# Patient Record
Sex: MALE | Race: WHITE | NOT HISPANIC OR LATINO | Employment: FULL TIME | URBAN - METROPOLITAN AREA
[De-identification: names, ages, dates, MRNs, and addresses within clinical notes are randomized per-mention and may not be internally consistent; named-entity substitution may affect disease eponyms.]

---

## 2019-02-11 ENCOUNTER — OFFICE VISIT (OUTPATIENT)
Dept: UROLOGY | Facility: CLINIC | Age: 49
End: 2019-02-11
Payer: COMMERCIAL

## 2019-02-11 VITALS
WEIGHT: 185.63 LBS | SYSTOLIC BLOOD PRESSURE: 123 MMHG | DIASTOLIC BLOOD PRESSURE: 82 MMHG | HEIGHT: 72 IN | HEART RATE: 87 BPM | BODY MASS INDEX: 25.14 KG/M2

## 2019-02-11 DIAGNOSIS — N40.1 BPH WITH URINARY OBSTRUCTION: ICD-10-CM

## 2019-02-11 DIAGNOSIS — N13.8 BPH WITH URINARY OBSTRUCTION: ICD-10-CM

## 2019-02-11 DIAGNOSIS — N41.1 PROSTATITIS, CHRONIC: Primary | ICD-10-CM

## 2019-02-11 PROCEDURE — 99999 PR PBB SHADOW E&M-NEW PATIENT-LVL III: ICD-10-PCS | Mod: PBBFAC,,, | Performed by: UROLOGY

## 2019-02-11 PROCEDURE — 99999 PR PBB SHADOW E&M-NEW PATIENT-LVL III: CPT | Mod: PBBFAC,,, | Performed by: UROLOGY

## 2019-02-11 PROCEDURE — 99244 OFF/OP CNSLTJ NEW/EST MOD 40: CPT | Mod: S$GLB,,, | Performed by: UROLOGY

## 2019-02-11 PROCEDURE — 99244 PR OFFICE CONSULTATION,LEVEL IV: ICD-10-PCS | Mod: S$GLB,,, | Performed by: UROLOGY

## 2019-02-11 RX ORDER — OMEPRAZOLE 40 MG/1
40 CAPSULE, DELAYED RELEASE ORAL DAILY
COMMUNITY
Start: 2016-12-01

## 2019-02-11 RX ORDER — ATORVASTATIN CALCIUM 10 MG/1
5 TABLET, FILM COATED ORAL NIGHTLY
Refills: 1 | COMMUNITY
Start: 2019-01-17

## 2019-02-11 RX ORDER — AMITRIPTYLINE HYDROCHLORIDE 75 MG/1
75 TABLET ORAL DAILY
COMMUNITY
Start: 2014-12-01 | End: 2022-09-01

## 2019-02-11 RX ORDER — CELECOXIB 200 MG/1
200 CAPSULE ORAL DAILY
COMMUNITY
Start: 2018-12-01 | End: 2022-09-01

## 2019-02-11 RX ORDER — TAMSULOSIN HYDROCHLORIDE 0.4 MG/1
0.4 CAPSULE ORAL NIGHTLY
Qty: 30 CAPSULE | Refills: 11 | Status: SHIPPED | OUTPATIENT
Start: 2019-02-11 | End: 2022-09-01

## 2019-02-11 RX ORDER — CIPROFLOXACIN 500 MG/1
500 TABLET ORAL 2 TIMES DAILY
Qty: 30 TABLET | Refills: 0 | Status: SHIPPED | OUTPATIENT
Start: 2019-02-11 | End: 2019-02-19

## 2019-02-11 NOTE — PROGRESS NOTES
CC: urinary frequency, urgency, hx of prostatitis, epididymitis    Jakob Taylor is a 48 y.o. man who is here for the evaluation of Urinary Frequency; Nocturia; and Dysuria    A new pt referred by his PCP, Dr. Drew Marrero.  He is here to establish his urologic care with me.  Seen by other urologists in the past.  He had vasectomy done by Dr. Gonsalez and was treated for epididymitis in the past.  Also developed prostatitis and was treated by another urologist.  He is a  and has been drinking a gallon to 1 1/2 gallon every day.  Noticed increased frequency and urgency.  Also c/o intermittent penile pain at the tip of his penis, which got better after 2 days or so.  Denies flank pain, dysuira, hematuria.      No family hx of prostate cancer.      Past Medical History:   Diagnosis Date    Hernia, inguinal, left     History of chicken pox      Past Surgical History:   Procedure Laterality Date    HERNIA REPAIR      TONSILLECTOMY       Social History     Tobacco Use    Smoking status: Never Smoker    Smokeless tobacco: Never Used   Substance Use Topics    Alcohol use: No     Alcohol/week: 0.0 oz    Drug use: No     Family History   Family history unknown: Yes     Allergy:  Review of patient's allergies indicates:  No Known Allergies  Outpatient Encounter Medications as of 2/11/2019   Medication Sig Dispense Refill    amitriptyline (ELAVIL) 75 MG tablet Take 75 mg by mouth once daily.      atorvastatin (LIPITOR) 10 MG tablet Take 10 mg by mouth every evening.  1    celecoxib (CELEBREX) 200 MG capsule Take 200 mg by mouth once daily.      omeprazole (PRILOSEC) 40 MG capsule Take 40 mg by mouth once daily.      amitriptyline (ELAVIL) 150 MG Tab Take 150 mg by mouth every evening.      ciprofloxacin HCl (CIPRO) 500 MG tablet Take 1 tablet (500 mg total) by mouth 2 (two) times daily. for 15 doses 30 tablet 0    hydrocodone-acetaminophen 5-325mg (NORCO) 5-325 mg per tablet Take 1 tablet by  mouth nightly as needed. 30 tablet 0    meloxicam (MOBIC) 15 MG tablet Take 15 mg by mouth once daily.      metaxalone (SKELAXIN) 800 MG tablet Take 1 tablet (800 mg total) by mouth 3 (three) times daily as needed. 90 tablet 0    tamsulosin (FLOMAX) 0.4 mg Cap Take 1 capsule (0.4 mg total) by mouth every evening. 30 capsule 11     No facility-administered encounter medications on file as of 2/11/2019.      Review of Systems   ROS  Physical Exam     Vitals:    02/11/19 1514   BP: 123/82   Pulse: 87     Physical Exam   Constitutional: He is oriented to person, place, and time. He appears well-developed and well-nourished. No distress.   HENT:   Head: Normocephalic and atraumatic.   Right Ear: External ear normal.   Left Ear: External ear normal.   Nose: Nose normal.   Mouth/Throat: Oropharynx is clear and moist.   Eyes: Conjunctivae are normal. Pupils are equal, round, and reactive to light.   Neck: Normal range of motion. Neck supple. No JVD present. No tracheal deviation present. No thyromegaly present.   Cardiovascular: Normal rate, regular rhythm, normal heart sounds and intact distal pulses.  Exam reveals no gallop and no friction rub.    No murmur heard.  Pulmonary/Chest: Effort normal and breath sounds normal. No respiratory distress. He has no wheezes. He exhibits no tenderness.   Abdominal: Soft. Bowel sounds are normal. He exhibits no distension and no mass. There is no tenderness. There is no rebound and no guarding.   Genitourinary: Rectum normal and penis normal. No penile tenderness.   Genitourinary Comments: Prostate 30 grams with negative nodule or negative tenderness, but slight boggy.     Musculoskeletal: Normal range of motion. He exhibits no edema, tenderness or deformity.   Lymphadenopathy:     He has no cervical adenopathy.   Neurological: He is alert and oriented to person, place, and time.   Skin: Skin is warm and dry. He is not diaphoretic.     Psychiatric: He has a normal mood and affect.  His behavior is normal. Thought content normal.     Genitalia:  Scrotum: no rash or lesion  Normal symmetric epididymis without masses  Normal vas palpated  Normal size, symmetric testicles with no masses   Normal urethral meatus with no discharge  Normal circumcised penis with no lesion   Rectal:  Normal perineum and anus upon inspection.  Normal tone, no masses or tenderness;     LABS:  Lab Results   Component Value Date    PSA 0.5 05/19/2008     No results found for this or any previous visit.  Lab Results   Component Value Date    CREATININE 1.1 05/15/2008     No results found for this or any previous visit.  No results found for: LABURIN  UA clear    Assessment and Plan:  Jakob was seen today for urinary frequency, nocturia and dysuria.    Diagnoses and all orders for this visit:    Prostatitis, chronic  -     ciprofloxacin HCl (CIPRO) 500 MG tablet; Take 1 tablet (500 mg total) by mouth 2 (two) times daily. for 15 doses    BPH with urinary obstruction  -     tamsulosin (FLOMAX) 0.4 mg Cap; Take 1 capsule (0.4 mg total) by mouth every evening.    first will treat him for suspected prostatitis with cipro for 2 wks.  Start flomax.  Do voiding diary 3 days ( volume-flow recordings).  All questions answered.    Follow-up:  Follow-up in about 6 weeks (around 3/25/2019) for voiding diary for 3 days.

## 2019-03-25 ENCOUNTER — OFFICE VISIT (OUTPATIENT)
Dept: UROLOGY | Facility: CLINIC | Age: 49
End: 2019-03-25
Payer: OTHER GOVERNMENT

## 2019-03-25 VITALS
HEART RATE: 71 BPM | HEIGHT: 72 IN | DIASTOLIC BLOOD PRESSURE: 71 MMHG | BODY MASS INDEX: 25.65 KG/M2 | SYSTOLIC BLOOD PRESSURE: 126 MMHG | WEIGHT: 189.38 LBS

## 2019-03-25 DIAGNOSIS — R33.9 INCOMPLETE BLADDER EMPTYING: ICD-10-CM

## 2019-03-25 DIAGNOSIS — R35.0 FREQUENCY OF URINATION: ICD-10-CM

## 2019-03-25 PROCEDURE — 99999 PR PBB SHADOW E&M-EST. PATIENT-LVL III: ICD-10-PCS | Mod: PBBFAC,,, | Performed by: UROLOGY

## 2019-03-25 PROCEDURE — 99999 PR PBB SHADOW E&M-EST. PATIENT-LVL III: CPT | Mod: PBBFAC,,, | Performed by: UROLOGY

## 2019-03-25 PROCEDURE — 99214 PR OFFICE/OUTPT VISIT, EST, LEVL IV, 30-39 MIN: ICD-10-PCS | Mod: S$GLB,,, | Performed by: UROLOGY

## 2019-03-25 PROCEDURE — 99214 OFFICE O/P EST MOD 30 MIN: CPT | Mod: S$GLB,,, | Performed by: UROLOGY

## 2019-03-25 NOTE — PROGRESS NOTES
CC: urinary frequency, urgency, hx of prostatitis, epididymitis    Jakob Taylor is a 48 y.o. man who is here for the evaluation of Follow-up (6 week with voiding trial)    He came with a voiding diary  After taking cipro, he experienced no more dysuria or pain at the tip of his penis.  Has been on flomax but he did not notice significant change in his frequency.  He can still use a bathroom 3 x within 1 hour.  His maximal bladder capacity 600 ml.  He typically void 225 ml.    Seen by other urologists in the past.  He had vasectomy done by Dr. Gonsalez and was treated for epididymitis in the past.  Also developed prostatitis and was treated by another urologist.  He is a  and has been drinking a gallon to 1 1/2 gallon every day.  Noticed increased frequency and urgency.  Also c/o intermittent penile pain at the tip of his penis, which got better after 2 days or so.  Denies flank pain, dysuira, hematuria.      No family hx of prostate cancer.      Past Medical History:   Diagnosis Date    Hernia, inguinal, left     History of chicken pox      Past Surgical History:   Procedure Laterality Date    HERNIA REPAIR      TONSILLECTOMY       Social History     Tobacco Use    Smoking status: Never Smoker    Smokeless tobacco: Never Used   Substance Use Topics    Alcohol use: No     Alcohol/week: 0.0 oz    Drug use: No     Family History   Family history unknown: Yes     Allergy:  Review of patient's allergies indicates:  No Known Allergies  Outpatient Encounter Medications as of 3/25/2019   Medication Sig Dispense Refill    amitriptyline (ELAVIL) 150 MG Tab Take 150 mg by mouth every evening.      amitriptyline (ELAVIL) 75 MG tablet Take 75 mg by mouth once daily.      atorvastatin (LIPITOR) 10 MG tablet Take 10 mg by mouth every evening.  1    celecoxib (CELEBREX) 200 MG capsule Take 200 mg by mouth once daily.      meloxicam (MOBIC) 15 MG tablet Take 15 mg by mouth once daily.       metaxalone (SKELAXIN) 800 MG tablet Take 1 tablet (800 mg total) by mouth 3 (three) times daily as needed. 90 tablet 0    omeprazole (PRILOSEC) 40 MG capsule Take 40 mg by mouth once daily.      tamsulosin (FLOMAX) 0.4 mg Cap Take 1 capsule (0.4 mg total) by mouth every evening. 30 capsule 11    hydrocodone-acetaminophen 5-325mg (NORCO) 5-325 mg per tablet Take 1 tablet by mouth nightly as needed. 30 tablet 0     No facility-administered encounter medications on file as of 3/25/2019.      Review of Systems   ROS  Physical Exam     Vitals:    03/25/19 1449   BP: 126/71   Pulse: 71     Physical Exam   Constitutional: He is oriented to person, place, and time. He appears well-developed and well-nourished. No distress.   HENT:   Head: Normocephalic and atraumatic.   Right Ear: External ear normal.   Left Ear: External ear normal.   Nose: Nose normal.   Mouth/Throat: Oropharynx is clear and moist.   Eyes: Conjunctivae are normal. Pupils are equal, round, and reactive to light.   Neck: Normal range of motion. Neck supple. No JVD present. No tracheal deviation present. No thyromegaly present.   Cardiovascular: Normal rate, regular rhythm, normal heart sounds and intact distal pulses.  Exam reveals no gallop and no friction rub.    No murmur heard.  Pulmonary/Chest: Effort normal and breath sounds normal. No respiratory distress. He has no wheezes. He exhibits no tenderness.   Abdominal: Soft. Bowel sounds are normal. He exhibits no distension and no mass. There is no tenderness. There is no rebound and no guarding.   Genitourinary: Rectum normal and penis normal. No penile tenderness.   Genitourinary Comments: Prostate 30 grams with negative nodule or negative tenderness, but slight boggy.     Musculoskeletal: Normal range of motion. He exhibits no edema, tenderness or deformity.   Lymphadenopathy:     He has no cervical adenopathy.   Neurological: He is alert and oriented to person, place, and time.   Skin: Skin is  warm and dry. He is not diaphoretic.     Psychiatric: He has a normal mood and affect. His behavior is normal. Thought content normal.     Genitalia:  Scrotum: no rash or lesion  Normal symmetric epididymis without masses  Normal vas palpated  Normal size, symmetric testicles with no masses   Normal urethral meatus with no discharge  Normal circumcised penis with no lesion   Rectal:  Normal perineum and anus upon inspection.  Normal tone, no masses or tenderness;     LABS:  Lab Results   Component Value Date    PSA 0.5 05/19/2008     No results found for this or any previous visit.  Lab Results   Component Value Date    CREATININE 1.1 05/15/2008     No results found for this or any previous visit.  No results found for: LABURIN  UA clear    Assessment and Plan:  Jakob was seen today for follow-up.    Diagnoses and all orders for this visit:    Frequency of urination  -     Simple Urodynamics w/ Cysto; Future    Incomplete bladder emptying  -     Simple Urodynamics w/ Cysto; Future    I reviewed his voiding diary.  suspect incomplete bladder emptying.  He does tend to drink a lot of fluid.  His urinary symptoms are much less during the weekend when he does not drink any much of water.  He is a  and he seemed to have developed certain habit of using bathroom.  Will further evaluate him with SUDS cysto.  Continue flomax .  I spent 25 minutes with the patient of which more than half was spent in direct consultation with the patient in regards to our treatment and plan.    All questions answered.    Follow-up:  Follow up for suds cysto.

## 2019-04-16 ENCOUNTER — PROCEDURE VISIT (OUTPATIENT)
Dept: UROLOGY | Facility: CLINIC | Age: 49
End: 2019-04-16
Payer: OTHER GOVERNMENT

## 2019-04-16 VITALS
BODY MASS INDEX: 25.06 KG/M2 | RESPIRATION RATE: 18 BRPM | HEIGHT: 72 IN | TEMPERATURE: 98 F | SYSTOLIC BLOOD PRESSURE: 149 MMHG | DIASTOLIC BLOOD PRESSURE: 81 MMHG | HEART RATE: 65 BPM | WEIGHT: 185 LBS

## 2019-04-16 DIAGNOSIS — R33.9 INCOMPLETE BLADDER EMPTYING: ICD-10-CM

## 2019-04-16 DIAGNOSIS — R35.0 FREQUENCY OF URINATION: ICD-10-CM

## 2019-04-16 PROCEDURE — 51728 PR COMPLEX CYSTOMETROGRAM VOIDING PRESSURE STUDIES: ICD-10-PCS | Mod: 26,,, | Performed by: UROLOGY

## 2019-04-16 PROCEDURE — 51797 PR VOIDING PRESS STUDY INTRA-ABDOMINAL VOID: ICD-10-PCS | Mod: 26,,, | Performed by: UROLOGY

## 2019-04-16 PROCEDURE — 52000 PR CYSTOURETHROSCOPY: ICD-10-PCS | Mod: 59,,, | Performed by: UROLOGY

## 2019-04-16 PROCEDURE — 51741 PR UROFLOWMETRY, COMPLEX: ICD-10-PCS | Mod: 26,51,, | Performed by: UROLOGY

## 2019-04-16 PROCEDURE — 51797 INTRAABDOMINAL PRESSURE TEST: CPT | Mod: 26,,, | Performed by: UROLOGY

## 2019-04-16 PROCEDURE — 51784 PR ANAL/URINARY MUSCLE STUDY: ICD-10-PCS | Mod: 26,51,, | Performed by: UROLOGY

## 2019-04-16 PROCEDURE — 52000 CYSTOURETHROSCOPY: CPT | Mod: 59,,, | Performed by: UROLOGY

## 2019-04-16 PROCEDURE — 51741 ELECTRO-UROFLOWMETRY FIRST: CPT | Mod: 26,51,, | Performed by: UROLOGY

## 2019-04-16 PROCEDURE — 51784 ANAL/URINARY MUSCLE STUDY: CPT | Mod: 26,51,, | Performed by: UROLOGY

## 2019-04-16 PROCEDURE — 51728 CYSTOMETROGRAM W/VP: CPT | Mod: 26,,, | Performed by: UROLOGY

## 2019-04-16 RX ORDER — LIDOCAINE HYDROCHLORIDE 20 MG/ML
JELLY TOPICAL ONCE
Status: COMPLETED | OUTPATIENT
Start: 2019-04-16 | End: 2019-04-16

## 2019-04-16 RX ORDER — CIPROFLOXACIN 500 MG/1
500 TABLET ORAL ONCE
Status: DISCONTINUED | OUTPATIENT
Start: 2019-04-16 | End: 2022-09-01

## 2019-04-16 RX ADMIN — LIDOCAINE HYDROCHLORIDE: 20 JELLY TOPICAL at 03:04

## 2019-04-16 NOTE — PATIENT INSTRUCTIONS
SIMPLE URODYNAMIC STUDY (SUDS) & CYSTOSCOPY  UROLOGY CLINIC DISCHARGE INSTRUCTIONS    You have had a procedure that will require time to properly heal. Follow the instructions you have been given on how to care for yourself once you are home. Below is additional information to help in your recovery.    ACTIVITY  · There are no restrictions in activity. Start doing again the things you did before the procedure.  · You may experience a slight burning sensation. You may notice a small amount of blood in your urine. This will clear up within a day. Call the clinic if this continues beyond 48 hours.    DIET  · Continue your normal diet. You may eat the same foods you ate before your procedure.  · Drink plenty of fluids during the first 24-48 hours following your procedure.    MEDICATIONS  · Resume all other previous medications from your prescribing physician.  · Continue any pre=procedure antibiotics until they are all gone.    SIGNS AND SYMPTOMS TO REPORT TO THE DOCTOR  · Chills or fever greater than 101° F within 24 hours of procedure.  · Changes in urination, such as increased bleeding, foul smell, cloudy urine, or painful urination.  · Call your doctor with any questions or concerns.    For any emergency situation, call [phone] immediately or go to your nearest emergency room.    [facility]  [phone]  After hours or on the weekends call [phone] and ask to speak with an urology resident on-call with any questions or concerns

## 2019-04-17 NOTE — PROCEDURES
Simple Urodynamics w/ Cysto  Date/Time: 4/16/2019 9:40 PM  Performed by: Bobby Smallwood MD  Authorized by: Bobby Smallwood MD   Comments: Procedure Date:  04/16/2019    Procedure:   Diagnostic Cystourethroscopy   Complex Cystometrogram   Voiding / Pressure Study with Intrarectal Balloon   Complex Uroflow   Electromyogram of Anal Sphincter.     Pre-OP Diagnosis:   frequency   Post-OP Diagnosis:   Normal study  Anesthesia:   Anesthesia Administered:   Intraurethral instillation of 10 mL 2% lidocaine (Xylocaine) jelly.   Findings:   --- Bladder ---   CYSTOMETROGRAM ( Filling Phase ):   Cystometric Numeric Data:   - First Desire (Sensation): 285 mL at 7cm of water.   - Normal Desire: 298mL at 6 cm of water.   - Strong Desire: 383 mL at 9 cm of water.   - Urgency (Imminent Void) : 551 mL at 14cm of water.   - Maximum Cystometric Capacity: 552 mL.   Compliance:   - normal.   Leak Point Pressure:   - Valsalva ( Abdominal ) Leak Point Pressure: none.   UROFLOW:   - Voided Volume: 595 mL.   - Residual Urine: 10 mL.   - Maximum Flow Rate: 28 mL/sec.   - Flow Pattern: normal  VOIDING PRESSURE STUDY ( Voiding Phase ):   Detrusor Pressure:   - Maximum Detrusor Pressure: 58cm of water.   - Detrusor Pressure at Maximum Flow: 50 cm of water.   - Detrusor Contraction Characteristics: Sustained contraction(s).   ELECTROMYOGRAM:   - normal.     ---Diagnostic Cystourethroscopy ---   Normal urethra.    Prostate 3.5 cm with no obstruction  Width of Bladder Neck Opening: Approximately 18 Fr.   Normal bladder.   Normal ureteral orifices bilaterally.       Description of Procedure:   Informed Consent:   - Risks, benefits and alternatives of procedure discussed with   patient and informed consent obtained.   Patient Position:   - Supine.   --- Bladder ---   Prep and Drape:   - Patient prepped and draped in usual sterile fashion using povidone   iodine (Betadine).   --- Diagnostic Cystourethroscopy ---   Instruments:   - 16 Fr flexible  cystoscope with 0 degree lens.   Procedure Details:   - Cystoscope passed under vision into bladder.   - Bladder and urethra examined in their entirety with findings as   above.   --- Urodynamic Studies ---   Procedure Details:   Cystometrogram:   - Catheter(s) passed into the bladder.   - Rectal balloon inserted.   - Catheter(s) connected to infusion medium and to pressure recording   device.   - Infusion Rate: 30 mL / min.   Electromyogram:   - Perineal electromyogram pad placed and connected to electromyogram   recording device.   Equipment:   - Catheters: Double lumen catheter.   - Medium: Liquid.   - Pressure Recording Device: Calibrated electronic equipment.   Complications:   No immediate complications.    CONCLUSIONS:   1. Normal exam    His urinary frequency is due to his high fluid intake.  No urologic problems noted.    Post-OP Plan:   Patient was discharged home in a stable condition.  Medications: abx  Follow up:  As needed

## 2022-08-23 ENCOUNTER — TELEPHONE (OUTPATIENT)
Dept: NEUROLOGY | Facility: CLINIC | Age: 52
End: 2022-08-23
Payer: OTHER GOVERNMENT

## 2022-08-23 NOTE — TELEPHONE ENCOUNTER
Returned call to patient. No answer. Left rosemariee to return call to discuss. We don't have a neurologist in [place] that treats ear clicking or muscle spasms in the face.

## 2022-08-23 NOTE — TELEPHONE ENCOUNTER
Spoke with patient and advised we don't have any neurologists in [place] that would treat ear clicking or muscle spasms in the jaw. Patient has seen PCP, ENT and dentist. Advised this might be something that is treated at main [place]. Patient states will do some research.

## 2022-08-23 NOTE — TELEPHONE ENCOUNTER
----- Message from Jim Slater sent at 8/23/2022  3:17 PM CDT -----  Regarding: appt advice  Type: Needs Medical Advice    Who Called:  kerry    Symptoms (please be specific):  ear clicking, pt has seen ent but thinks is muscle spasm issue    How long has patient had these symptoms:  3 weeks    Pharmacy name and phone #:    [id number] - [place] - [id number]  [place] [id number]  Phone: [phone] Fax: [phone]    Best Call Back Number: [phone]    Additional Information: please call to discuss proper provider for scheduling.

## 2022-09-01 ENCOUNTER — HOSPITAL ENCOUNTER (OUTPATIENT)
Dept: RADIOLOGY | Facility: HOSPITAL | Age: 52
Discharge: HOME | End: 2022-09-01
Attending: PHYSICIAN ASSISTANT
Payer: OTHER GOVERNMENT

## 2022-09-01 ENCOUNTER — OFFICE VISIT (OUTPATIENT)
Dept: PAIN MEDICINE | Facility: CLINIC | Age: 52
End: 2022-09-01
Payer: OTHER GOVERNMENT

## 2022-09-01 VITALS
DIASTOLIC BLOOD PRESSURE: 84 MMHG | BODY MASS INDEX: 24.22 KG/M2 | HEIGHT: 72 IN | HEART RATE: 83 BPM | WEIGHT: 178.81 LBS | SYSTOLIC BLOOD PRESSURE: 132 MMHG

## 2022-09-01 DIAGNOSIS — M54.50 CHRONIC BILATERAL LOW BACK PAIN WITHOUT SCIATICA: Primary | ICD-10-CM

## 2022-09-01 DIAGNOSIS — M54.50 CHRONIC BILATERAL LOW BACK PAIN WITHOUT SCIATICA: ICD-10-CM

## 2022-09-01 DIAGNOSIS — G89.29 CHRONIC BILATERAL LOW BACK PAIN WITHOUT SCIATICA: ICD-10-CM

## 2022-09-01 DIAGNOSIS — G89.29 CHRONIC BILATERAL LOW BACK PAIN WITHOUT SCIATICA: Primary | ICD-10-CM

## 2022-09-01 PROCEDURE — 99203 PR OFFICE/OUTPT VISIT, NEW, LEVL III, 30-44 MIN: ICD-10-PCS | Mod: S$GLB,,, | Performed by: PHYSICIAN ASSISTANT

## 2022-09-01 PROCEDURE — 72114 X-RAY EXAM L-S SPINE BENDING: CPT | Mod: 26,,, | Performed by: RADIOLOGY

## 2022-09-01 PROCEDURE — 99203 OFFICE O/P NEW LOW 30 MIN: CPT | Mod: S$GLB,,, | Performed by: PHYSICIAN ASSISTANT

## 2022-09-01 PROCEDURE — 99999 PR PBB SHADOW E&M-EST. PATIENT-LVL IV: CPT | Mod: PBBFAC,,, | Performed by: PHYSICIAN ASSISTANT

## 2022-09-01 PROCEDURE — 72114 X-RAY EXAM L-S SPINE BENDING: CPT | Mod: TC,PO

## 2022-09-01 PROCEDURE — 72114 XR LUMBAR SPINE 5 VIEW WITH FLEX AND EXT: ICD-10-PCS | Mod: 26,,, | Performed by: RADIOLOGY

## 2022-09-01 PROCEDURE — 99999 PR PBB SHADOW E&M-EST. PATIENT-LVL IV: ICD-10-PCS | Mod: PBBFAC,,, | Performed by: PHYSICIAN ASSISTANT

## 2022-09-01 RX ORDER — METHYLPREDNISOLONE 4 MG/1
TABLET ORAL
Qty: 1 EACH | Refills: 0 | Status: SHIPPED | OUTPATIENT
Start: 2022-09-01 | End: 2022-09-22

## 2022-09-01 RX ORDER — TIZANIDINE 4 MG/1
4 TABLET ORAL NIGHTLY PRN
Qty: 40 TABLET | Refills: 0 | Status: SHIPPED | OUTPATIENT
Start: 2022-09-01 | End: 2022-09-23

## 2022-09-01 RX ORDER — CYCLOBENZAPRINE HCL 10 MG
TABLET ORAL
COMMUNITY
Start: 2022-08-11 | End: 2022-09-01 | Stop reason: ALTCHOICE

## 2022-09-01 RX ORDER — CLONAZEPAM 1 MG/1
1 TABLET ORAL NIGHTLY
COMMUNITY

## 2022-09-01 NOTE — PROGRESS NOTES
Back and Spine New Patient    Patient ID: Jakob Taylor is a 51 y.o. male.    Chief Complaint   Patient presents with    Low-back Pain     Started 8-7-22 after moving a box, now constant, movement makes pain worse.       Review of Systems   Constitutional:  Negative for activity change, chills, fatigue and unexpected weight change.   HENT:  Positive for tinnitus. Negative for hearing loss, trouble swallowing and voice change.    Eyes:  Negative for visual disturbance.   Respiratory:  Negative for apnea, chest tightness and shortness of breath.    Cardiovascular:  Negative for chest pain and palpitations.   Gastrointestinal:  Negative for abdominal pain, constipation, diarrhea, nausea and vomiting.   Genitourinary:  Negative for difficulty urinating, dysuria and frequency.   Musculoskeletal:  Positive for back pain. Negative for gait problem, neck pain and neck stiffness.   Skin:  Negative for wound.   Neurological:  Negative for dizziness, tremors, seizures, facial asymmetry, speech difficulty, weakness, light-headedness, numbness and headaches.   Psychiatric/Behavioral:  Negative for confusion and decreased concentration.      Past Medical History:   Diagnosis Date    Hernia, inguinal, left     History of chicken pox      Social History     Socioeconomic History    Marital status:    Tobacco Use    Smoking status: Never    Smokeless tobacco: Never   Substance and Sexual Activity    Alcohol use: No     Alcohol/week: 0.0 standard drinks    Drug use: No     Family History   Family history unknown: Yes     Review of patient's allergies indicates:  No Known Allergies    Current Outpatient Medications:     atorvastatin (LIPITOR) 10 MG tablet, Take 10 mg by mouth every evening., Disp: , Rfl: 1    clonazePAM (KLONOPIN) 1 MG tablet, Take 1 mg by mouth every evening., Disp: , Rfl:     hydrocodone-acetaminophen 5-325mg (NORCO) 5-325 mg per tablet, Take 1 tablet by mouth nightly as needed., Disp: 30 tablet, Rfl:  0    meloxicam (MOBIC) 15 MG tablet, Take 15 mg by mouth once daily., Disp: , Rfl:     omeprazole (PRILOSEC) 40 MG capsule, Take 40 mg by mouth once daily., Disp: , Rfl:     methylPREDNISolone (MEDROL, ANNIE,) 4 mg tablet, use as directed, Disp: 1 each, Rfl: 0    tiZANidine (ZANAFLEX) 4 MG tablet, Take 1 tablet (4 mg total) by mouth nightly as needed (muscle spasms/ pain)., Disp: 40 tablet, Rfl: 0  No current facility-administered medications for this visit.    Vitals:    09/01/22 1301   BP: 132/84   BP Location: Right arm   Patient Position: Sitting   BP Method: Medium (Automatic)   Pulse: 83   Weight: 81.1 kg (178 lb 12.7 oz)   Height: 6' (1.829 m)       Physical Exam  Vitals and nursing note reviewed.   Constitutional:       Appearance: He is well-developed.   HENT:      Head: Normocephalic and atraumatic.   Eyes:      Pupils: Pupils are equal, round, and reactive to light.   Cardiovascular:      Rate and Rhythm: Normal rate.   Pulmonary:      Effort: Pulmonary effort is normal.   Abdominal:      General: There is no distension.   Musculoskeletal:         General: Normal range of motion.      Cervical back: Normal range of motion and neck supple.   Skin:     General: Skin is warm and dry.   Neurological:      Mental Status: He is alert and oriented to person, place, and time.      Coordination: Finger-Nose-Finger Test normal.      Gait: Gait is intact. Tandem walk normal.      Deep Tendon Reflexes:      Reflex Scores:       Tricep reflexes are 2+ on the right side and 2+ on the left side.       Bicep reflexes are 2+ on the right side and 2+ on the left side.       Brachioradialis reflexes are 2+ on the right side and 2+ on the left side.       Patellar reflexes are 2+ on the right side and 2+ on the left side.       Achilles reflexes are 2+ on the right side and 2+ on the left side.  Psychiatric:         Speech: Speech normal.         Behavior: Behavior normal.         Thought Content: Thought content normal.          Judgment: Judgment normal.       Neurologic Exam     Mental Status   Oriented to person, place, and time.   Oriented to person.   Oriented to place.   Oriented to time.   Attention: normal. Concentration: normal.   Speech: speech is normal   Level of consciousness: alert  Knowledge: consistent with education.     Cranial Nerves     CN III, IV, VI   Pupils are equal, round, and reactive to light.    CN XI   Right sternocleidomastoid strength: normal  Left sternocleidomastoid strength: normal  Right trapezius strength: normal  Left trapezius strength: normal    Motor Exam   Muscle bulk: normal  Overall muscle tone: normal  Right arm tone: normal  Left arm tone: normal  Right arm pronator drift: absent  Left arm pronator drift: absent  Right leg tone: normal  Left leg tone: normal    Strength   Right neck flexion: 5/5  Left neck flexion: 5/5  Right neck extension: 5/5  Left neck extension: 5/5  Right deltoid: 5/5  Left deltoid: 5/5  Right biceps: 5/5  Left biceps: 5/5  Right triceps: 5/5  Left triceps: 5/5  Right wrist flexion: 5/5  Left wrist flexion: 5/5  Right wrist extension: 5/5  Left wrist extension: 5/5  Right interossei: 5/5  Left interossei: 5/5  Right abdominals: 5/5  Left abdominals: 5/5  Right iliopsoas: 5/5  Left iliopsoas: 5/5  Right quadriceps: 5/5  Left quadriceps: 5/5  Right hamstrin/5  Left hamstrin/5  Right glutei: 5/5  Left glutei: 5/5  Right anterior tibial: 5/5  Left anterior tibial: 5/5  Right posterior tibial: 5/5  Left posterior tibial: 5/5  Right peroneal: 5/5  Left peroneal: 5/5  Right gastroc: 5/5  Left gastroc: 5/5    Sensory Exam   Right arm light touch: normal  Left arm light touch: normal  Right leg light touch: normal  Left leg light touch: normal    Gait, Coordination, and Reflexes     Gait  Gait: normal    Coordination   Finger to nose coordination: normal  Tandem walking coordination: normal    Tremor   Resting tremor: absent  Intention tremor: absent  Action tremor:  absent    Reflexes   Right brachioradialis: 2+  Left brachioradialis: 2+  Right biceps: 2+  Left biceps: 2+  Right triceps: 2+  Left triceps: 2+  Right patellar: 2+  Left patellar: 2+  Right achilles: 2+  Left achilles: 2+  Right Wall: absent  Left Wall: absent  Right ankle clonus: absent  Left ankle clonus: absent    Provider dictation:    51 year old male with hyperlipidemia presents to discuss lower back pain.  Pain started after moving a box 8/7/22.  He felt a pull in the lower back.  He continued to work as a /  for about a week, then dedveloped covid and was out of work for 2 weeks.  He just returned to work 3 days ago.  He has pain across the lower lumbar region.  No radicular leg pain, numbness or tingling.  Pain increased with leaning forward, twisting, movement.  He takes mobic daily and recently tried flexeril for an ear spasm, but it did not improve back pain.  He has been doing home stretching.    Current medications:  mobic  Medications tried:  fleceril  Physical therapy:  none  Interventional Procedures: none     On exam, there is 5/5 strength with 2+ DTR and no sensory deficits.  Gait and station fluid.  Denies bowel/ bladder dysfunction.  Full range of motion of the upper and lower extremities. No pain with axial facet loading.  No SI joint pain on palpation.  No pain with lumbar flexion/ extension.  No tenderness along the spinous processes.    No imaging.    Mr. Du has acute onset lower lumbar back pain without radiculopathy nor neurological deficits.  Pain pattern most consistent with myofascial/ mechanical pain.  We will try oral steroid taper and zanalfex.  Discussed PT, but with his work schedule, he is unable to make time for hit at this time.  Will get xrays to rule out structural issue and reassure him.  Will call with imaging results.  Handouts given for exercises at home.    Visit Diagnosis:  Chronic bilateral low back pain without sciatica  -      X-Ray Lumbar Complete Including Flex And Ext; Future; Expected date: 09/01/2022  -     methylPREDNISolone (MEDROL, ANNIE,) 4 mg tablet; use as directed  Dispense: 1 each; Refill: 0  -     tiZANidine (ZANAFLEX) 4 MG tablet; Take 1 tablet (4 mg total) by mouth nightly as needed (muscle spasms/ pain).  Dispense: 40 tablet; Refill: 0      Total time spent counseling greater than fifty percent of total visit time.  Counseling included discussion regarding imaging findings, diagnosis possibilities, treatment options, risks and benefits.   The patient had many questions regarding the options and long-term effects.

## 2022-09-23 DIAGNOSIS — G89.29 CHRONIC BILATERAL LOW BACK PAIN WITHOUT SCIATICA: ICD-10-CM

## 2022-09-23 DIAGNOSIS — M54.50 CHRONIC BILATERAL LOW BACK PAIN WITHOUT SCIATICA: ICD-10-CM

## 2022-09-23 RX ORDER — TIZANIDINE 4 MG/1
4 TABLET ORAL NIGHTLY PRN
Qty: 30 TABLET | Refills: 1 | Status: SHIPPED | OUTPATIENT
Start: 2022-09-23 | End: 2022-10-20

## 2022-11-24 DIAGNOSIS — G89.29 CHRONIC BILATERAL LOW BACK PAIN WITHOUT SCIATICA: ICD-10-CM

## 2022-11-24 DIAGNOSIS — M54.50 CHRONIC BILATERAL LOW BACK PAIN WITHOUT SCIATICA: ICD-10-CM

## 2022-11-27 RX ORDER — TIZANIDINE 4 MG/1
4 TABLET ORAL NIGHTLY PRN
Qty: 30 TABLET | Refills: 0 | Status: SHIPPED | OUTPATIENT
Start: 2022-11-27 | End: 2023-02-27

## 2022-11-30 NOTE — TELEPHONE ENCOUNTER
"Informed pt that Kell refilled zanaflex and Kell stated "If still having pain, recommend clinic follow up." Pt stated he is feeling much better and has been doing his exercises.   "

## 2022-12-16 ENCOUNTER — OFFICE VISIT (OUTPATIENT)
Dept: ORTHOPEDICS | Facility: CLINIC | Age: 52
End: 2022-12-16
Payer: OTHER GOVERNMENT

## 2022-12-16 ENCOUNTER — HOSPITAL ENCOUNTER (OUTPATIENT)
Dept: RADIOLOGY | Facility: HOSPITAL | Age: 52
Discharge: HOME | End: 2022-12-16
Attending: FAMILY MEDICINE
Payer: OTHER GOVERNMENT

## 2022-12-16 VITALS — WEIGHT: 178 LBS | HEIGHT: 72 IN | BODY MASS INDEX: 24.11 KG/M2 | RESPIRATION RATE: 18 BRPM

## 2022-12-16 DIAGNOSIS — S83.242A TEAR OF MEDIAL MENISCUS OF LEFT KNEE, UNSPECIFIED TEAR TYPE, UNSPECIFIED WHETHER OLD OR CURRENT TEAR, INITIAL ENCOUNTER: Primary | ICD-10-CM

## 2022-12-16 DIAGNOSIS — M25.562 LEFT KNEE PAIN, UNSPECIFIED CHRONICITY: ICD-10-CM

## 2022-12-16 DIAGNOSIS — M25.562 ACUTE PAIN OF LEFT KNEE: ICD-10-CM

## 2022-12-16 DIAGNOSIS — M25.562 LEFT KNEE PAIN, UNSPECIFIED CHRONICITY: Primary | ICD-10-CM

## 2022-12-16 PROCEDURE — 73562 XR KNEE ORTHO LEFT WITH FLEXION: ICD-10-PCS | Mod: 26,RT,, | Performed by: RADIOLOGY

## 2022-12-16 PROCEDURE — 73564 X-RAY EXAM KNEE 4 OR MORE: CPT | Mod: 26,LT,, | Performed by: RADIOLOGY

## 2022-12-16 PROCEDURE — 99204 PR OFFICE/OUTPT VISIT, NEW, LEVL IV, 45-59 MIN: ICD-10-PCS | Mod: 25,S$GLB,, | Performed by: FAMILY MEDICINE

## 2022-12-16 PROCEDURE — 20610 DRAIN/INJ JOINT/BURSA W/O US: CPT | Mod: LT,S$GLB,, | Performed by: FAMILY MEDICINE

## 2022-12-16 PROCEDURE — 20610 LARGE JOINT ASPIRATION/INJECTION: L KNEE: ICD-10-PCS | Mod: LT,S$GLB,, | Performed by: FAMILY MEDICINE

## 2022-12-16 PROCEDURE — 73562 X-RAY EXAM OF KNEE 3: CPT | Mod: TC,PN,RT

## 2022-12-16 PROCEDURE — 99204 OFFICE O/P NEW MOD 45 MIN: CPT | Mod: 25,S$GLB,, | Performed by: FAMILY MEDICINE

## 2022-12-16 PROCEDURE — 73562 X-RAY EXAM OF KNEE 3: CPT | Mod: 26,RT,, | Performed by: RADIOLOGY

## 2022-12-16 PROCEDURE — 73564 XR KNEE ORTHO LEFT WITH FLEXION: ICD-10-PCS | Mod: 26,LT,, | Performed by: RADIOLOGY

## 2022-12-16 PROCEDURE — 99999 PR PBB SHADOW E&M-EST. PATIENT-LVL III: ICD-10-PCS | Mod: PBBFAC,,, | Performed by: FAMILY MEDICINE

## 2022-12-16 PROCEDURE — 99999 PR PBB SHADOW E&M-EST. PATIENT-LVL III: CPT | Mod: PBBFAC,,, | Performed by: FAMILY MEDICINE

## 2022-12-16 RX ORDER — METHYLPREDNISOLONE ACETATE 80 MG/ML
80 INJECTION, SUSPENSION INTRA-ARTICULAR; INTRALESIONAL; INTRAMUSCULAR; SOFT TISSUE
Status: DISCONTINUED | OUTPATIENT
Start: 2022-12-16 | End: 2022-12-16 | Stop reason: HOSPADM

## 2022-12-16 RX ADMIN — METHYLPREDNISOLONE ACETATE 80 MG: 80 INJECTION, SUSPENSION INTRA-ARTICULAR; INTRALESIONAL; INTRAMUSCULAR; SOFT TISSUE at 08:12

## 2022-12-16 NOTE — PROGRESS NOTES
Subjective:      Patient ID: Jakob Taylor is a 51 y.o. male.    Chief Complaint: Pain of the Left Knee    Patient here today with complaints of left knee pain.  This been ongoing for the last month.  No known injury or known cause.  Pain is located in the medial aspect of his knee near the joint line.  Described as sharp with the sensation of clicking with full flexion.  Notes that he has increased pain while weight-bearing especially walking on uneven surface.  Pain is actually not as bad when walking however when he stops her standing still for long periods it becomes worse.  Also becomes worse with twisting.  He is not noticed any swelling of the knee.  Denies that the knee has locked up or become stuck.  He works as a  and walks about 10 miles a day.  He is on meloxicam for other issues but notes it does not seem to help his knee.  He is also tried supplementing with ibuprofen but has not noticed any difference.  His knee pain was much worse yesterday.  Today he states it is not as bad.  He was off for a full week of work this week.    Leg Pain   The pain is present in the left knee. The pain radiates to the left knee. This is a new problem. The current episode started 1 to 4 weeks ago. There has been no history of extremity trauma. The injury was the result of a twisting action while at home. The problem occurs daily. The problem has been unchanged. The quality of the pain is described as sharp and shooting. The pain is at a severity of 4/10. Associated symptoms include stiffness. Pertinent negatives include no fever, inability to bear weight, itching, joint locking, limited range of motion, numbness or tingling. The symptoms are aggravated by extension, standing and walking. He has tried NSAIDs, oral narcotics, OTC ointments and OTC pain meds for the symptoms. The treatment provided no relief. Physical therapy was not tried.    Review of Systems   Constitutional: Negative for diaphoresis and  fever.   HENT:  Negative for ear pain, hearing loss, nosebleeds and tinnitus.    Eyes:  Negative for pain, redness and visual disturbance.   Cardiovascular:  Negative for chest pain and palpitations.   Respiratory:  Negative for cough and shortness of breath.    Skin:  Negative for itching and rash.   Musculoskeletal:  Positive for stiffness. Negative for back pain, joint swelling and myalgias.   Gastrointestinal:  Negative for constipation, diarrhea, nausea and vomiting.   Genitourinary:  Negative for frequency and hematuria.   Neurological:  Negative for dizziness, headaches, numbness, seizures, tingling and weakness.   Psychiatric/Behavioral:  The patient is not nervous/anxious.    Allergic/Immunologic: Positive for environmental allergies.       Objective:            General    Nursing note and vitals reviewed.  Constitutional: He is oriented to person, place, and time. He appears well-developed and well-nourished.   HENT:   Nose: Nose normal.   Eyes: EOM are normal. Pupils are equal, round, and reactive to light.   Neck: Neck supple.   Cardiovascular:  Normal rate.            Pulmonary/Chest: Effort normal.   Abdominal: Soft.   Neurological: He is alert and oriented to person, place, and time. He has normal reflexes.   Psychiatric: He has a normal mood and affect. His behavior is normal. Judgment and thought content normal.           Right Knee Exam   Right knee exam is normal.    Inspection   Effusion: absent    Range of Motion   Extension:  50   Flexion:  140     Tests   Meniscus   Alfreda:  Medial - negative Lateral - negative  Ligament Examination   Lachman: normal (-1 to 2mm)   PCL-Posterior Drawer: normal (0 to 2mm)     MCL - Valgus: normal (0 to 2mm)  LCL - Varus: normal  Patella   Patellar apprehension: negative  Passive Patellar Tilt: neutral  Patellar Tracking: normal    Other   Popliteal (Baker's) Cyst: absent  Sensation: normal    Left Knee Exam     Inspection   Effusion: absent    Tenderness   The  patient tender to palpation of the medial joint line.    Crepitus   The patient has crepitus of the patella.    Range of Motion   Extension:  50   Flexion:  140     Tests   Meniscus   Alfreda:  Medial - negative Lateral - negative  Stability   Lachman: normal (-1 to 2mm)   PCL-Posterior Drawer: normal (0 to 2mm)  MCL - Valgus: normal (0 to 2mm)  LCL - Varus: normal (0 to 2mm)  Patella   Patellar apprehension: negative  Passive Patellar Tilt: neutral  Patellar Tracking: normal    Other   Popliteal (Baker's) Cyst: absent  Sensation: normal    Muscle Strength   Right Lower Extremity   Quadriceps:  5/5   Hamstrin/5   Left Lower Extremity   Quadriceps:  5/5   Hamstrin/5     Vascular Exam     Right Pulses  Dorsalis Pedis:      2+          Left Pulses  Dorsalis Pedis:      2+        X-ray images ordered obtained interpreted by me.  They show no obvious bony abnormalities.  Well-preserved joint spaces with no significant degenerative changes.  No acute fractures are present.          Assessment:       Encounter Diagnoses   Name Primary?    Acute pain of left knee     Tear of medial meniscus of left knee, unspecified tear type, unspecified whether old or current tear, initial encounter Yes          Plan:       Jakob was seen today for pain.    Diagnoses and all orders for this visit:    Tear of medial meniscus of left knee, unspecified tear type, unspecified whether old or current tear, initial encounter    Acute pain of left knee      No clear cause of his pain on physical exam.  Clinically I suspect a small medial meniscus tear that is made worse with increased activity.  Discussed with patient treatment options.  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  For this reason I offered him a steroid shot in the knee today and he agreed with this plan.    Large Joint Aspiration/Injection: L knee    Date/Time: 2022 8:00 AM  Performed by: Azeem Hensley  MD  Authorized by: Azeem Hensley MD     Consent Done?:  Yes (Verbal)  Indications:  Pain and diagnostic evaluation  Site marked: the procedure site was marked    Timeout: prior to procedure the correct patient, procedure, and site was verified    Prep: patient was prepped and draped in usual sterile fashion      Local anesthesia used?: Yes    Local anesthetic:  Lidocaine 1% without epinephrine and topical anesthetic  Anesthetic total (ml):  2      Details:  Needle Size:  22 G  Ultrasonic Guidance for needle placement?: No    Approach:  Anterolateral  Location:  Knee  Site:  L knee  Medications:  80 mg methylPREDNISolone acetate 80 mg/mL  Patient tolerance:  Patient tolerated the procedure well with no immediate complications

## 2023-02-10 ENCOUNTER — OFFICE VISIT (OUTPATIENT)
Dept: PAIN MEDICINE | Facility: CLINIC | Age: 53
End: 2023-02-10
Payer: OTHER GOVERNMENT

## 2023-02-10 VITALS
SYSTOLIC BLOOD PRESSURE: 133 MMHG | HEIGHT: 72 IN | WEIGHT: 177.38 LBS | DIASTOLIC BLOOD PRESSURE: 86 MMHG | BODY MASS INDEX: 24.03 KG/M2 | HEART RATE: 65 BPM

## 2023-02-10 DIAGNOSIS — M54.50 ACUTE RIGHT-SIDED LOW BACK PAIN WITHOUT SCIATICA: Primary | ICD-10-CM

## 2023-02-10 PROCEDURE — 99214 OFFICE O/P EST MOD 30 MIN: CPT | Mod: S$GLB,,, | Performed by: PHYSICIAN ASSISTANT

## 2023-02-10 PROCEDURE — 99214 PR OFFICE/OUTPT VISIT, EST, LEVL IV, 30-39 MIN: ICD-10-PCS | Mod: S$GLB,,, | Performed by: PHYSICIAN ASSISTANT

## 2023-02-10 PROCEDURE — 99999 PR PBB SHADOW E&M-EST. PATIENT-LVL III: CPT | Mod: PBBFAC,,, | Performed by: PHYSICIAN ASSISTANT

## 2023-02-10 PROCEDURE — 99999 PR PBB SHADOW E&M-EST. PATIENT-LVL III: ICD-10-PCS | Mod: PBBFAC,,, | Performed by: PHYSICIAN ASSISTANT

## 2023-02-16 ENCOUNTER — OFFICE VISIT (OUTPATIENT)
Dept: PAIN MEDICINE | Facility: CLINIC | Age: 53
End: 2023-02-16
Payer: OTHER GOVERNMENT

## 2023-02-16 ENCOUNTER — HOSPITAL ENCOUNTER (OUTPATIENT)
Dept: RADIOLOGY | Facility: HOSPITAL | Age: 53
Discharge: HOME | End: 2023-02-16
Attending: PHYSICIAN ASSISTANT
Payer: OTHER GOVERNMENT

## 2023-02-16 VITALS
WEIGHT: 172.81 LBS | HEART RATE: 76 BPM | SYSTOLIC BLOOD PRESSURE: 145 MMHG | HEIGHT: 72 IN | BODY MASS INDEX: 23.41 KG/M2 | DIASTOLIC BLOOD PRESSURE: 86 MMHG

## 2023-02-16 DIAGNOSIS — M54.9 DORSALGIA, UNSPECIFIED: ICD-10-CM

## 2023-02-16 DIAGNOSIS — M54.16 LUMBAR RADICULOPATHY, CHRONIC: ICD-10-CM

## 2023-02-16 DIAGNOSIS — M54.16 LUMBAR RADICULOPATHY, CHRONIC: Primary | ICD-10-CM

## 2023-02-16 PROCEDURE — 99213 PR OFFICE/OUTPT VISIT, EST, LEVL III, 20-29 MIN: ICD-10-PCS | Mod: S$GLB,,, | Performed by: PHYSICIAN ASSISTANT

## 2023-02-16 PROCEDURE — 72114 X-RAY EXAM L-S SPINE BENDING: CPT | Mod: TC,PO

## 2023-02-16 PROCEDURE — 72114 X-RAY EXAM L-S SPINE BENDING: CPT | Mod: 26,,, | Performed by: RADIOLOGY

## 2023-02-16 PROCEDURE — 99213 OFFICE O/P EST LOW 20 MIN: CPT | Mod: S$GLB,,, | Performed by: PHYSICIAN ASSISTANT

## 2023-02-16 PROCEDURE — 99999 PR PBB SHADOW E&M-EST. PATIENT-LVL IV: ICD-10-PCS | Mod: PBBFAC,,, | Performed by: PHYSICIAN ASSISTANT

## 2023-02-16 PROCEDURE — 72114 XR LUMBAR SPINE 5 VIEW WITH FLEX AND EXT: ICD-10-PCS | Mod: 26,,, | Performed by: RADIOLOGY

## 2023-02-16 PROCEDURE — 99999 PR PBB SHADOW E&M-EST. PATIENT-LVL IV: CPT | Mod: PBBFAC,,, | Performed by: PHYSICIAN ASSISTANT

## 2023-02-16 NOTE — LETTER
February 16, 2023               [facility]  [address]  [place] [address]  Phone: [phone]  Fax: [phone] February 16, 2023     Patient: Jakob Taylor    YOB: 1970   Date of Visit: 2/16/2023       To Whom It May Concern:    It is my medical opinion that Jakob Taylor  may return to work as of 2/24/23 with no restrictions.  He was seen 2/16/23 in clinic and has follow up appointment 2/23/22.    If you have any questions or concerns, please don't hesitate to call.    Sincerely,          Kell Lentz PA-C      Pt presents to  with c/o fatigue and exposure to covid.  Pt reports he has not been feeling well since yesterday

## 2023-02-16 NOTE — PROGRESS NOTES
[facility] Established Patient          PCP:   none listed    CC:   Chief Complaint   Patient presents with    Hip Pain     C/o pain in right hip and right leg    Back Pain     C/o lower back pain       No flowsheet data found.      HPI:   Jakob Taylor is a 52 y.o. male with history of hyperlipidemia and PTSD presents with lower back pain.  Seen in September 2022 for similar pain; it resolved with medrol, zanaflex and 1 month home exercise.  He is a  who has a walking route to deliver mail.  Pain started 2/3/23 when lifting a box to put up in a closet.  He had right sided lower lumbar back pain without radiculopathy.  He has taken prednisone and indocin (prescribed by brother who is an ER physicain).  Currently taking meloxicam and zanaflex.  He has tried continuing with some exericse.  Pain has continued to flare up and not controlled with home exercise.  He currently has pain in the right lower back radiatint to the right groin and anterior/ outer/ inner thigh stopping at the right knee.  It is associated with numbness in the same area as well.  He is concerned because symptoms are worsening.  The increase in pain makes PTSD symptoms worse.      Initial HPI:  51 year old male with hyperlipidemia presents to discuss lower back pain.  Pain started after moving a box 8/7/22.  He felt a pull in the lower back.  He continued to work as a /  for about a week, then dedveloped covid and was out of work for 2 weeks.  He just returned to work 3 days ago.  He has pain across the lower lumbar region.  No radicular leg pain, numbness or tingling.  Pain increased with leaning forward, twisting, movement.  He takes mobic daily and recently tried flexeril for an ear spasm, but it did not improve back pain.  He has been doing home stretching.      Past and current medications:  Antineuropathics:  NSAIDs:  mobic (indocin, completed)  Antidepressants:  Muscle relaxers:   zanaflex (tried flexeril)  Opioids:  Antiplatelets/Anticoagulants:  Others - (completed prednisone taper)    Physical therapy/ Chiropractic care:  None; has been doing home exercise    Pain Intervention History:  none    Past Spine Surgical History:  none      History:    Current Outpatient Medications:     atorvastatin (LIPITOR) 10 MG tablet, Take 5 mg by mouth every evening., Disp: , Rfl: 1    clonazePAM (KLONOPIN) 1 MG tablet, Take 1 mg by mouth every evening., Disp: , Rfl:     meloxicam (MOBIC) 15 MG tablet, Take 15 mg by mouth once daily., Disp: , Rfl:     omeprazole (PRILOSEC) 40 MG capsule, Take 40 mg by mouth once daily., Disp: , Rfl:     tiZANidine (ZANAFLEX) 4 MG tablet, TAKE 1 TABLET (4 MG TOTAL) BY MOUTH NIGHTLY AS NEEDED (MUSCLE SPASMS/ PAIN)., Disp: 30 tablet, Rfl: 0    hydrocodone-acetaminophen 5-325mg (NORCO) 5-325 mg per tablet, Take 1 tablet by mouth nightly as needed. (Patient not taking: Reported on 2/16/2023), Disp: 30 tablet, Rfl: 0    Past Medical History:   Diagnosis Date    Hernia, inguinal, left     History of chicken pox        Past Surgical History:   Procedure Laterality Date    HERNIA REPAIR      TONSILLECTOMY         Family History   Family history unknown: Yes       Social History     Socioeconomic History    Marital status:    Tobacco Use    Smoking status: Never    Smokeless tobacco: Never   Substance and Sexual Activity    Alcohol use: No     Alcohol/week: 0.0 standard drinks    Drug use: No       Review of patient's allergies indicates:  No Known Allergies    Review of Systems:  Low back pain.  Balance of review of systems is negative.    Physical Exam:  Vitals:    02/16/23 1424   BP: (!) 145/86   Pulse: 76   Weight: 78.4 kg (172 lb 13.5 oz)   Height: 6' (1.829 m)   PainSc:   9   PainLoc: Back     Body mass index is 23.44 kg/m².    Gen: NAD  Psych: mood appropriate for given condition  HEENT: eyes anicteric   CV: RRR, 2+ radial pulse  HEENT: anicteric   Respiratory:  non-labored, no signs of respiratory distress  Abd: non-distended  Skin: warm, dry and intact.  Gait: Able to heel walk, toe walk. No antalgic gait.     Coordination:   Romberg: negative  Finger to nose coordination: normal  Heel to shin coordination: normal  Tandem walking coordination: normal    Cervical spine:   ROM is full in flexion, extension and lateral rotation without increased pain.  Spurling's maneuver causes no neck pain to either side.  Myofascial exam: No Tenderness to palpation across cervical paraspinous region bilaterally.    Lumbar spine:   ROM is full with flexion extension and oblique extension with no increased pain.    Cristian's test causes no increased pain on either side.    Supine straight leg raise is negative bilaterally.    Internal and external rotation of the hip causes no increased pain on either side.  Myofascial exam: No tenderness to palpation across lumbar paraspinous muscles. No tenderness to palpation over the bilateral greater trochanters and bilateral SI joint    Sensory:  Intact and symmetrical to light touch in C4-T1 dermatomes bilaterally. Intact and symmetrical to light touch in L1-S1 dermatomes bilaterally.    Motor:    Right Left   C4 Shoulder Abduction  5  5   C5 Elbow Flexion    5  5   C6 Wrist Extension  5  5   C7 Elbow Extension   5  5   C8/T1 Hand Intrinsics   5  5        Right Left   L2/3 Iliacus Hip flexion  5  5   L3/4 Qudratus Femoris Knee Extension  5  5   L4/5 Tib Anterior Ankle Dorsiflexion   5  5   L5/S1 Extensor Hallicus Longus Great toe extension  5  5   S1/S2 Gastroc/Soleus Plantar Flexion  5  5      Right Left   Triceps DTR 2+ 2+   Biceps DTR 2+ 2+   Brachioradialis DTR 2+ 2+   Patellar DTR 2+ 2+   Achilles DTR 2+ 2+   Wall Absent  Absent   Clonus Absent Absent   Babinski Absent Absent     Imaging:    Xray lumbar spine 9-1-22:  Bone density is normal the lumbar vertebral bodies maintain normal height.  There is no fracture.  Alignment is grossly  normal.  There is only mild disc space narrowing at the lower 2 lumbar levels where there is also facet joint arthropathy.  No dynamic instability is demonstrated.    Labs:  No results found for: LABA1C, HGBA1C    Lab Results   Component Value Date    WBC 7.18 05/15/2008    HGB 14.6 05/15/2008    HCT 41.9 05/15/2008    MCV 89.9 05/15/2008     05/15/2008           Assessment:     Mr. Robert has acute onset lower lumbar back pain withright thigh radicualr pain.  No impromvment with medications and home exercise.   We discussed PT and I strongly encourage him to pursue PT  to help end this exacerbation and prevent furhter ones.  Pain this severe does influence PTSD symptoms.  Given increase in pain, also recommend MRI and xrays lumbar spine to further assess for neural compression contributing to pain  and to determine if he is a candidate for ALEXIS.  Follow up after imaging.    Problem List Items Addressed This Visit    None  Visit Diagnoses       Lumbar radiculopathy, chronic    -  Primary    Relevant Orders    X-Ray Lumbar Complete Including Flex And Ext    MRI Lumbar Spine Without Contrast    Dorsalgia, unspecified        Relevant Orders    X-Ray Lumbar Complete Including Flex And Ext    MRI Lumbar Spine Without Contrast          The total time spent for evaluation and management on 02/16/2023 including reviewing separately obtained history, performing a medically appropriate exam and evaluation, documenting clinical information in the health record, independently interpreting results and communicating them to the patient/family/caregiver, and ordering medications/tests/procedures was between 30-39 minutes.      Follow Up: RTC after imaging.    : Not applicable          Kell Lentz PA-C  [facility]      This note was completed with dictation software and grammatical errors may exist.

## 2023-02-17 ENCOUNTER — PATIENT MESSAGE (OUTPATIENT)
Dept: PAIN MEDICINE | Facility: CLINIC | Age: 53
End: 2023-02-17
Payer: OTHER GOVERNMENT

## 2023-02-20 ENCOUNTER — TELEPHONE (OUTPATIENT)
Dept: PAIN MEDICINE | Facility: CLINIC | Age: 53
End: 2023-02-20
Payer: OTHER GOVERNMENT

## 2023-02-20 NOTE — TELEPHONE ENCOUNTER
----- Message from Kell Lentz PA-C sent at 2/20/2023  3:33 PM CST -----  Please let him know that I have already contacted his insurance company.  The peer to peer is scheduled for tomorrow morning.  This was the soonest his insurance company had available.    I am aware his MRI is scheduled for Wednesday morning.  I will try to send a message through [email or web address] tomorrow after the peer to peer is complete to him to tell him if it is approved or denied.  Originally denied because he has not completed formal PT.        ----- Message -----  From: Patricia Contreras LPN  Sent: 2/20/2023   3:11 PM CST  To: Kell Lentz PA-C      ----- Message -----  From: Brittani Lopez  Sent: 2/20/2023   3:09 PM CST  To: Tor ARRIETA Staff    Who Called: Janae    What is the request in detail: Requesting call back to schedule peer to peer to authorize upcoming MRI. Please advise    Can the clinic reply by [facility]? No    Best Call Back Number: [phone] option 4    Additional Information: Pt is scheduled for MRI 02/22/23

## 2023-02-20 NOTE — TELEPHONE ENCOUNTER
"Informed pt that Kell Lentz stated "Please let him know that I have already contacted his insurance company.  The peer to peer is scheduled for tomorrow morning.  This was the soonest his insurance company had available.     I am aware his MRI is scheduled for Wednesday morning.  I will try to send a message through [employer] tomorrow after the peer to peer is complete to him to tell him if it is approved or denied.  Originally denied because he has not completed formal PT." Pt stated he understood and already talked to his insurance company about it.   "

## 2023-02-21 ENCOUNTER — PATIENT MESSAGE (OUTPATIENT)
Dept: PAIN MEDICINE | Facility: CLINIC | Age: 53
End: 2023-02-21
Payer: OTHER GOVERNMENT

## 2023-02-22 ENCOUNTER — HOSPITAL ENCOUNTER (OUTPATIENT)
Dept: RADIOLOGY | Facility: HOSPITAL | Age: 53
Discharge: HOME | End: 2023-02-22
Attending: PHYSICIAN ASSISTANT
Payer: OTHER GOVERNMENT

## 2023-02-22 DIAGNOSIS — M54.16 LUMBAR RADICULOPATHY, CHRONIC: ICD-10-CM

## 2023-02-22 DIAGNOSIS — M54.9 DORSALGIA, UNSPECIFIED: ICD-10-CM

## 2023-02-22 PROCEDURE — 72148 MRI LUMBAR SPINE W/O DYE: CPT | Mod: 26,,, | Performed by: RADIOLOGY

## 2023-02-22 PROCEDURE — 72148 MRI LUMBAR SPINE WITHOUT CONTRAST: ICD-10-PCS | Mod: 26,,, | Performed by: RADIOLOGY

## 2023-02-22 PROCEDURE — 72148 MRI LUMBAR SPINE W/O DYE: CPT | Mod: TC

## 2023-02-23 ENCOUNTER — OFFICE VISIT (OUTPATIENT)
Dept: PAIN MEDICINE | Facility: CLINIC | Age: 53
End: 2023-02-23
Payer: OTHER GOVERNMENT

## 2023-02-23 VITALS
BODY MASS INDEX: 23.85 KG/M2 | SYSTOLIC BLOOD PRESSURE: 126 MMHG | WEIGHT: 176.06 LBS | HEIGHT: 72 IN | HEART RATE: 82 BPM | DIASTOLIC BLOOD PRESSURE: 83 MMHG

## 2023-02-23 DIAGNOSIS — M51.26 LUMBAR DISC HERNIATION: ICD-10-CM

## 2023-02-23 DIAGNOSIS — M54.16 LUMBAR RADICULOPATHY, CHRONIC: Primary | ICD-10-CM

## 2023-02-23 PROCEDURE — 99999 PR PBB SHADOW E&M-EST. PATIENT-LVL III: ICD-10-PCS | Mod: PBBFAC,,, | Performed by: PHYSICIAN ASSISTANT

## 2023-02-23 PROCEDURE — 99999 PR PBB SHADOW E&M-EST. PATIENT-LVL III: CPT | Mod: PBBFAC,,, | Performed by: PHYSICIAN ASSISTANT

## 2023-02-23 PROCEDURE — 99214 PR OFFICE/OUTPT VISIT, EST, LEVL IV, 30-39 MIN: ICD-10-PCS | Mod: S$GLB,,, | Performed by: PHYSICIAN ASSISTANT

## 2023-02-23 PROCEDURE — 99214 OFFICE O/P EST MOD 30 MIN: CPT | Mod: S$GLB,,, | Performed by: PHYSICIAN ASSISTANT

## 2023-02-23 NOTE — PROGRESS NOTES
[facility] Established Patient          PCP:   none listed    CC:   Chief Complaint   Patient presents with    Low-back Pain     C/o pain in lower back on right side that radiates down right leg      No flowsheet data found.      HPI:   Jakob Taylor is a 52 y.o. male with history of hyperlipidemia and PTSD presents with lower back pain after undergoing MRI.  Seen in September 2022 for similar pain; it resolved with medrol, zanaflex and 1 month home exercise.  He is a  who has a walking route to deliver mail.  Pain started 2/3/23 when lifting a box to put up in a closet.  He had right sided lower lumbar back pain without radiculopathy.  He took prednisone and indocin (prescribed by brother who is an ER physicain).  Currently taking meloxicam and zanaflex.  He has tried continuing with some exericse.  Pain has continued to flare up and not controlled with home exercise.  He has aching burning pain in the right lower back radiating to the right groin and anterior/ outer/ inner thigh stopping at the right knee.  It is associated with numbness in the same area as well.  The increase in pain makes PTSD symptoms worse.      Initial HPI:  51 year old male with hyperlipidemia presents to discuss lower back pain.  Pain started after moving a box 8/7/22.  He felt a pull in the lower back.  He continued to work as a /  for about a week, then dedveloped covid and was out of work for 2 weeks.  He just returned to work 3 days ago.  He has pain across the lower lumbar region.  No radicular leg pain, numbness or tingling.  Pain increased with leaning forward, twisting, movement.  He takes mobic daily and recently tried flexeril for an ear spasm, but it did not improve back pain.  He has been doing home stretching.      Past and current medications:  Antineuropathics:  NSAIDs:  mobic (indocin, completed)  Antidepressants:  Muscle relaxers:  zanaflex (tried  flexeril)  Opioids:  Antiplatelets/Anticoagulants:  Others - (completed prednisone taper)    Physical therapy/ Chiropractic care:  None; has been doing home exercise    Pain Intervention History:  none    Past Spine Surgical History:  none      History:    Current Outpatient Medications:     atorvastatin (LIPITOR) 10 MG tablet, Take 5 mg by mouth every evening., Disp: , Rfl: 1    clonazePAM (KLONOPIN) 1 MG tablet, Take 1 mg by mouth every evening., Disp: , Rfl:     meloxicam (MOBIC) 15 MG tablet, Take 15 mg by mouth once daily., Disp: , Rfl:     omeprazole (PRILOSEC) 40 MG capsule, Take 40 mg by mouth once daily., Disp: , Rfl:     hydrocodone-acetaminophen 5-325mg (NORCO) 5-325 mg per tablet, Take 1 tablet by mouth nightly as needed. (Patient not taking: Reported on 2/16/2023), Disp: 30 tablet, Rfl: 0    tiZANidine (ZANAFLEX) 4 MG tablet, TAKE 1 TABLET (4 MG TOTAL) BY MOUTH NIGHTLY AS NEEDED (MUSCLE SPASMS/ PAIN). (Patient not taking: Reported on 2/23/2023), Disp: 30 tablet, Rfl: 0    Past Medical History:   Diagnosis Date    Hernia, inguinal, left     History of chicken pox        Past Surgical History:   Procedure Laterality Date    HERNIA REPAIR      TONSILLECTOMY         Family History   Family history unknown: Yes       Social History     Socioeconomic History    Marital status:    Tobacco Use    Smoking status: Never    Smokeless tobacco: Never   Substance and Sexual Activity    Alcohol use: No     Alcohol/week: 0.0 standard drinks    Drug use: No       Review of patient's allergies indicates:  No Known Allergies    Review of Systems:  Low back pain.  Balance of review of systems is negative.    Physical Exam:  Vitals:    02/23/23 0833   BP: 126/83   Pulse: 82   Weight: 79.9 kg (176 lb 0.6 oz)   Height: 6' (1.829 m)   PainSc:   8   PainLoc: Hip     Body mass index is 23.87 kg/m².    Gen: NAD  Psych: mood appropriate for given condition  HEENT: eyes anicteric   CV: RRR, 2+ radial pulse  HEENT: anicteric    Respiratory: non-labored, no signs of respiratory distress  Abd: non-distended  Skin: warm, dry and intact.  Gait: Able to heel walk, toe walk. No antalgic gait.     Coordination:   Romberg: negative  Finger to nose coordination: normal  Heel to shin coordination: normal  Tandem walking coordination: normal    Cervical spine:   ROM is full in flexion, extension and lateral rotation without increased pain.  Spurling's maneuver causes no neck pain to either side.  Myofascial exam: No Tenderness to palpation across cervical paraspinous region bilaterally.    Lumbar spine:   ROM is full with flexion extension and oblique extension with no increased pain.    Cristian's test causes no increased pain on either side.    Supine straight leg raise is negative bilaterally.    Internal and external rotation of the hip causes no increased pain on either side.  Myofascial exam: No tenderness to palpation across lumbar paraspinous muscles. No tenderness to palpation over the bilateral greater trochanters and bilateral SI joint    Sensory:  Intact and symmetrical to light touch in C4-T1 dermatomes bilaterally. Intact and symmetrical to light touch in L1-S1 dermatomes bilaterally.    Motor:    Right Left   C4 Shoulder Abduction  5  5   C5 Elbow Flexion    5  5   C6 Wrist Extension  5  5   C7 Elbow Extension   5  5   C8/T1 Hand Intrinsics   5  5        Right Left   L2/3 Iliacus Hip flexion  5  5   L3/4 Qudratus Femoris Knee Extension  5  5   L4/5 Tib Anterior Ankle Dorsiflexion   5  5   L5/S1 Extensor Hallicus Longus Great toe extension  5  5   S1/S2 Gastroc/Soleus Plantar Flexion  5  5      Right Left   Triceps DTR 2+ 2+   Biceps DTR 2+ 2+   Brachioradialis DTR 2+ 2+   Patellar DTR 2+ 2+   Achilles DTR 2+ 2+   Wall Absent  Absent   Clonus Absent Absent   Babinski Absent Absent     Imaging:    Xray lumbar spine 9-1-22:  Bone density is normal the lumbar vertebral bodies maintain normal height.  There is no fracture.  Alignment is  grossly normal.  There is only mild disc space narrowing at the lower 2 lumbar levels where there is also facet joint arthropathy.  No dynamic instability is demonstrated.    MRI lumbar spine 2/22/23:  multilevel degenerative changes throughout the lumbar spine.  Most significant changes at L1/2 with large right lateral recess disk hernia that extends inferiorly behind L2 causing significant right lateral recess stenosis at that level.    Labs:  No results found for: LABA1C, HGBA1C    Lab Results   Component Value Date    WBC 7.18 05/15/2008    HGB 14.6 05/15/2008    HCT 41.9 05/15/2008    MCV 89.9 05/15/2008     05/15/2008           Assessment:     Mr. Robert has acute onset lower lumbar back pain withright thigh radicualr pain - right L2 radiculopathy due to right L1/2 lateral recess disk hernia.  No impromvment with medications and home exercise.   Discussed alloptions including PT, L1/2 right transforaminal ALEXIS with pain management and seeing neurosurgery for further assessment.  He would like to see Dr. Samano (external neurosurgery) as he has seen him years ago for other issues.  Follow up as needed.     He did not request any further return to work letters/ time off today.    Problem List Items Addressed This Visit    None  Visit Diagnoses       Lumbar radiculopathy, chronic    -  Primary    Lumbar disc herniation                Follow Up: RTC as needed.    : Not applicable          Kell Lentz PA-C  [facility]      This note was completed with dictation software and grammatical errors may exist.

## 2023-02-27 ENCOUNTER — HOSPITAL ENCOUNTER (OUTPATIENT)
Dept: RADIOLOGY | Facility: HOSPITAL | Age: 53
Discharge: HOME | End: 2023-02-27
Attending: UROLOGY
Payer: OTHER GOVERNMENT

## 2023-02-27 ENCOUNTER — OFFICE VISIT (OUTPATIENT)
Dept: UROLOGY | Facility: CLINIC | Age: 53
End: 2023-02-27
Payer: OTHER GOVERNMENT

## 2023-02-27 VITALS — WEIGHT: 173.5 LBS | BODY MASS INDEX: 23.5 KG/M2 | HEIGHT: 72 IN

## 2023-02-27 DIAGNOSIS — Z87.19 HISTORY OF HERNIA REPAIR: ICD-10-CM

## 2023-02-27 DIAGNOSIS — N50.812 PAIN IN LEFT TESTICLE: Primary | ICD-10-CM

## 2023-02-27 DIAGNOSIS — Z98.890 HISTORY OF HERNIA REPAIR: ICD-10-CM

## 2023-02-27 DIAGNOSIS — N40.0 BPH WITHOUT URINARY OBSTRUCTION: ICD-10-CM

## 2023-02-27 DIAGNOSIS — N50.819 PAIN IN TESTICLE, UNSPECIFIED LATERALITY: ICD-10-CM

## 2023-02-27 LAB
BILIRUBIN, UA POC OHS: NEGATIVE
BLOOD, UA POC OHS: NEGATIVE
CLARITY, UA POC OHS: CLEAR
COLOR, UA POC OHS: YELLOW
GLUCOSE, UA POC OHS: NEGATIVE
KETONES, UA POC OHS: NEGATIVE
LEUKOCYTES, UA POC OHS: NEGATIVE
NITRITE, UA POC OHS: NEGATIVE
PH, UA POC OHS: 7.5
PROTEIN, UA POC OHS: NEGATIVE
SPECIFIC GRAVITY, UA POC OHS: 1.01
UROBILINOGEN, UA POC OHS: 0.2

## 2023-02-27 PROCEDURE — 99999 PR PBB SHADOW E&M-EST. PATIENT-LVL III: ICD-10-PCS | Mod: PBBFAC,,, | Performed by: UROLOGY

## 2023-02-27 PROCEDURE — 81003 POCT URINALYSIS(INSTRUMENT): ICD-10-PCS | Mod: QW,S$GLB,, | Performed by: UROLOGY

## 2023-02-27 PROCEDURE — 81003 URINALYSIS AUTO W/O SCOPE: CPT | Mod: QW,S$GLB,, | Performed by: UROLOGY

## 2023-02-27 PROCEDURE — 76870 US SCROTUM AND TESTICLES: ICD-10-PCS | Mod: 26,,, | Performed by: RADIOLOGY

## 2023-02-27 PROCEDURE — 99203 OFFICE O/P NEW LOW 30 MIN: CPT | Mod: S$GLB,,, | Performed by: UROLOGY

## 2023-02-27 PROCEDURE — 76870 US EXAM SCROTUM: CPT | Mod: 26,,, | Performed by: RADIOLOGY

## 2023-02-27 PROCEDURE — 76870 US EXAM SCROTUM: CPT | Mod: TC,PO

## 2023-02-27 PROCEDURE — 99203 PR OFFICE/OUTPT VISIT, NEW, LEVL III, 30-44 MIN: ICD-10-PCS | Mod: S$GLB,,, | Performed by: UROLOGY

## 2023-02-27 PROCEDURE — 99999 PR PBB SHADOW E&M-EST. PATIENT-LVL III: CPT | Mod: PBBFAC,,, | Performed by: UROLOGY

## 2023-02-27 NOTE — PROGRESS NOTES
Subjective:       Patient ID: Jakob Taylor is a 52 y.o. male.    Chief Complaint: Testicle Pain (Left sided pain with left sided hernia; pain comes and goes; left testicle retracts in with activity mainly. )    HPI    52-year-old with intermittent left testicular discomfort.  He also has pain in the left groin and he has some decreased sensitivity.  He had previous left hernia repair 30 years ago and is concerned that this may be causing some of the discomfort.  He has a known disc disease at L2.  He had a recent MRI which demonstrated disc extrusion at this level.  Has pain in his right hip and numbness in his right leg.  He also feels that sometimes the left testes retracts.  He denies any testicular swelling.  He has no bothersome urinary symptoms.  He denies hematuria and dysuria.  Works as a  and does walk considerable distance daily and has to climb in and out of his truck.      Past Medical History:   Diagnosis Date    Hernia, inguinal, left     History of chicken pox      Past Surgical History:   Procedure Laterality Date    HERNIA REPAIR      TONSILLECTOMY         Current Outpatient Medications:     atorvastatin (LIPITOR) 10 MG tablet, Take 5 mg by mouth every evening., Disp: , Rfl: 1    clonazePAM (KLONOPIN) 1 MG tablet, Take 1 mg by mouth every evening., Disp: , Rfl:     meloxicam (MOBIC) 15 MG tablet, Take 15 mg by mouth once daily., Disp: , Rfl:     omeprazole (PRILOSEC) 40 MG capsule, Take 40 mg by mouth once daily., Disp: , Rfl:       Review of Systems   Constitutional:  Negative for fever.   Eyes:  Negative for visual disturbance.   Respiratory:  Negative for shortness of breath.    Cardiovascular:  Negative for chest pain.   Gastrointestinal:  Negative for nausea.   Genitourinary:  Positive for testicular pain. Negative for dysuria, hematuria and scrotal swelling.   Musculoskeletal:  Negative for gait problem.   Skin:  Negative for rash.   Neurological:  Negative for seizures.    Psychiatric/Behavioral:  Negative for confusion.      Objective:      Physical Exam  Vitals reviewed.   Constitutional:       Appearance: He is well-developed.   HENT:      Head: Normocephalic and atraumatic.   Eyes:      Conjunctiva/sclera: Conjunctivae normal.   Cardiovascular:      Rate and Rhythm: Normal rate.   Pulmonary:      Effort: Pulmonary effort is normal.   Abdominal:      Hernia: There is no hernia in the left inguinal area or right inguinal area.   Genitourinary:     Penis: Normal.       Testes: Normal.      Epididymis:      Right: Normal.      Left: Normal.      Prostate: Enlarged (40g, s/s/a). Not tender.      Rectum: No mass. Normal anal tone.   Musculoskeletal:         General: Normal range of motion.   Skin:     General: Skin is warm and dry.      Findings: No rash.   Neurological:      Mental Status: He is alert and oriented to person, place, and time.       Assessment:       1. Pain in left testicle    2. History of hernia repair    3. BPH without urinary obstruction          Plan:       Pain in left testicle  -     POCT Urinalysis(Instrument)  -     US Scrotum And Testicles; Future; Expected date: 02/27/2023    History of hernia repair  -     POCT Urinalysis(Instrument)    BPH without urinary obstruction      I suspect his pain is musculoskeletal in origin.  I recommend rest and anti-inflammatories.  I will get a baseline scrotal ultrasound.

## 2023-06-06 ENCOUNTER — HOSPITAL ENCOUNTER (OUTPATIENT)
Dept: RADIOLOGY | Facility: HOSPITAL | Age: 53
Discharge: HOME | End: 2023-06-06
Attending: PHYSICAL MEDICINE & REHABILITATION
Payer: OTHER GOVERNMENT

## 2023-06-06 DIAGNOSIS — M25.551 PAIN IN RIGHT HIP: ICD-10-CM

## 2023-06-06 DIAGNOSIS — M25.511 PAIN IN RIGHT SHOULDER: ICD-10-CM

## 2023-06-06 PROCEDURE — 73502 X-RAY EXAM HIP UNI 2-3 VIEWS: CPT | Mod: 26,RT,, | Performed by: RADIOLOGY

## 2023-06-06 PROCEDURE — 73502 X-RAY EXAM HIP UNI 2-3 VIEWS: CPT | Mod: TC,FY,PO,RT

## 2023-06-06 PROCEDURE — 73030 XR SHOULDER COMPLETE 2 OR MORE VIEWS RIGHT: ICD-10-PCS | Mod: 26,RT,, | Performed by: RADIOLOGY

## 2023-06-06 PROCEDURE — 73030 X-RAY EXAM OF SHOULDER: CPT | Mod: 26,RT,, | Performed by: RADIOLOGY

## 2023-06-06 PROCEDURE — 73502 XR HIP WITH PELVIS WHEN PERFORMED, 2 OR 3  VIEWS RIGHT: ICD-10-PCS | Mod: 26,RT,, | Performed by: RADIOLOGY

## 2023-06-06 PROCEDURE — 73030 X-RAY EXAM OF SHOULDER: CPT | Mod: TC,FY,PO,RT

## 2023-06-23 ENCOUNTER — OFFICE VISIT (OUTPATIENT)
Dept: FAMILY MEDICINE | Facility: CLINIC | Age: 53
End: 2023-06-23
Payer: OTHER GOVERNMENT

## 2023-06-23 ENCOUNTER — OFFICE VISIT (OUTPATIENT)
Dept: ORTHOPEDICS | Facility: CLINIC | Age: 53
End: 2023-06-23
Payer: OTHER GOVERNMENT

## 2023-06-23 VITALS — WEIGHT: 173 LBS | BODY MASS INDEX: 23.43 KG/M2 | HEIGHT: 72 IN

## 2023-06-23 DIAGNOSIS — S83.242A TEAR OF MEDIAL MENISCUS OF LEFT KNEE, UNSPECIFIED TEAR TYPE, UNSPECIFIED WHETHER OLD OR CURRENT TEAR, INITIAL ENCOUNTER: Primary | ICD-10-CM

## 2023-06-23 DIAGNOSIS — M25.562 ACUTE PAIN OF LEFT KNEE: ICD-10-CM

## 2023-06-23 DIAGNOSIS — B96.89 LOCALIZED BACTERIAL SKIN INFECTION: ICD-10-CM

## 2023-06-23 DIAGNOSIS — S83.242A ACUTE MEDIAL MENISCUS TEAR OF LEFT KNEE, INITIAL ENCOUNTER: Primary | ICD-10-CM

## 2023-06-23 DIAGNOSIS — L08.9 LOCALIZED BACTERIAL SKIN INFECTION: ICD-10-CM

## 2023-06-23 DIAGNOSIS — L30.9 ECZEMA, UNSPECIFIED TYPE: Primary | ICD-10-CM

## 2023-06-23 PROCEDURE — 99214 PR OFFICE/OUTPT VISIT, EST, LEVL IV, 30-39 MIN: ICD-10-PCS | Mod: 95,,,

## 2023-06-23 PROCEDURE — 99999 PR PBB SHADOW E&M-EST. PATIENT-LVL III: ICD-10-PCS | Mod: PBBFAC,,, | Performed by: ORTHOPAEDIC SURGERY

## 2023-06-23 PROCEDURE — 99203 PR OFFICE/OUTPT VISIT, NEW, LEVL III, 30-44 MIN: ICD-10-PCS | Mod: S$GLB,,, | Performed by: ORTHOPAEDIC SURGERY

## 2023-06-23 PROCEDURE — 99203 OFFICE O/P NEW LOW 30 MIN: CPT | Mod: S$GLB,,, | Performed by: ORTHOPAEDIC SURGERY

## 2023-06-23 PROCEDURE — 99999 PR PBB SHADOW E&M-EST. PATIENT-LVL III: CPT | Mod: PBBFAC,,, | Performed by: ORTHOPAEDIC SURGERY

## 2023-06-23 PROCEDURE — 99214 OFFICE O/P EST MOD 30 MIN: CPT | Mod: 95,,,

## 2023-06-23 RX ORDER — MUPIROCIN 20 MG/G
OINTMENT TOPICAL 3 TIMES DAILY
Qty: 22 G | Refills: 0 | Status: SHIPPED | OUTPATIENT
Start: 2023-06-23
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
## 2023-06-23 NOTE — PROGRESS NOTES
Subjective:    The patient location is: [place]  The chief complaint leading to consultation is: dry skin    Visit type: audiovisual    Face to Face time with patient: 5 minutes  10 minutes of total time spent on the encounter, which includes face to face time and non-face to face time preparing to see the patient (eg, review of tests), Obtaining and/or reviewing separately obtained history, Documenting clinical information in the electronic or other health record, Independently interpreting results (not separately reported) and communicating results to the patient/family/caregiver, or Care coordination (not separately reported).         Each patient to whom he or she provides medical services by telemedicine is:  (1) informed of the relationship between the physician and patient and the respective role of any other health care provider with respect to management of the patient; and (2) notified that he or she may decline to receive medical services by telemedicine and may withdraw from such care at any time.    Notes:           Patient ID: Jakob Taylor is a 52 y.o. male.    Chief Complaint: No chief complaint on file.    Patient presents for virtual visit with complaints of dry and irritated skin on his hands. This has happened in the past and he was given mupirocin.  He is requesting refill. He does have a history of eczema and does keep his hands moisturized. He has no change in personal hygiene or laundry products.   Review of patient's allergies indicates:  No Known Allergies  Social Determinants of Health     Tobacco Use: Low Risk     Smoking Tobacco Use: Never    Smokeless Tobacco Use: Never    Passive Exposure: Not on file   Alcohol Use: Not on file   Financial Resource Strain: Not on file   Food Insecurity: Not on file   Transportation Needs: Not on file   Physical Activity: Not on file   Stress: Not on file   Social Connections: Not on file   Housing Stability: Not on file   Depression: Not on  file      Past Medical History:   Diagnosis Date    Hernia, inguinal, left     History of chicken pox       Past Surgical History:   Procedure Laterality Date    HERNIA REPAIR      TONSILLECTOMY        Social History     Socioeconomic History    Marital status:          Current Outpatient Medications:     atorvastatin (LIPITOR) 10 MG tablet, Take 5 mg by mouth every evening., Disp: , Rfl: 1    clonazePAM (KLONOPIN) 1 MG tablet, Take 1 mg by mouth every evening., Disp: , Rfl:     meloxicam (MOBIC) 15 MG tablet, Take 15 mg by mouth once daily., Disp: , Rfl:     mupirocin (BACTROBAN) 2 % ointment, Apply topically 3 (three) times daily., Disp: 22 g, Rfl: 0    omeprazole (PRILOSEC) 40 MG capsule, Take 40 mg by mouth once daily., Disp: , Rfl:     Lab Results   Component Value Date    WBC 7.18 05/15/2008    HGB 14.6 05/15/2008    HCT 41.9 05/15/2008     05/15/2008    CHOL 185 05/15/2008    TRIG 73 05/15/2008    HDL 51 05/15/2008    ALT 31 05/15/2008    AST 24 05/15/2008     05/15/2008    K 4.3 05/15/2008     05/15/2008    CREATININE 1.1 05/15/2008    BUN 18 05/15/2008    CO2 25 05/15/2008    TSH 0.87 05/15/2008    PSA 0.5 05/19/2008       Review of Systems   Integumentary:  Positive for rash.        Dry scaly skin on hands     Objective:      Physical Exam  Skin:     Findings: Rash present.      Comments: Skin on palms of hands are dry and peeling with erythema       Assessment:       1. Eczema, unspecified type    2. Localized bacterial skin infection        Plan:       Diagnoses and all orders for this visit:    Eczema, unspecified type  -     mupirocin (BACTROBAN) 2 % ointment; Apply topically 3 (three) times daily.    Localized bacterial skin infection  -     mupirocin (BACTROBAN) 2 % ointment; Apply topically 3 (three) times daily.           Eczema: apply mupirocin as prescribed. Continue using Cerave moisturizer cream.   Follow up if no improvement or worsening of symptoms.

## 2024-09-17 ENCOUNTER — TELEPHONE (OUTPATIENT)
Dept: NEUROSURGERY | Facility: CLINIC | Age: 54
End: 2024-09-17
Payer: OTHER GOVERNMENT

## 2024-09-17 NOTE — TELEPHONE ENCOUNTER
Returned call. Patient will drop off image for upload. He will contact us for appointment scheduling once completed     ----- Message from Luh Leroy sent at 9/17/2024 10:59 AM CDT -----  Regarding: referral  Good morning      [facility] would like to refer the following patient  in the Neurosurgery department. The patients diagnosis is other spondylosis, lumbar region. I have scanned the patients referral and records into .       Thank you,   Luh Berumen

## 2024-09-24 ENCOUNTER — TELEPHONE (OUTPATIENT)
Dept: NEUROSURGERY | Facility: CLINIC | Age: 54
End: 2024-09-24
Payer: OTHER GOVERNMENT

## 2024-09-24 NOTE — TELEPHONE ENCOUNTER
Returned call. Appointment successfully scheduled.     ----- Message from Ana Mark sent at 9/24/2024  8:03 AM CDT -----  Contact: Carmen  Type:  Needs Medical Advice    Who Called: Carmen RN coordinator for non [facility] care  Symptoms (please be specific): caller to follow up on getting an appt for pt   Would the patient rather a call back or a response via [facility]? call  Best Call Back Number:   Additional Information: please advise and thank you

## 2024-10-03 ENCOUNTER — OFFICE VISIT (OUTPATIENT)
Dept: NEUROSURGERY | Facility: CLINIC | Age: 54
End: 2024-10-03
Payer: OTHER GOVERNMENT

## 2024-10-03 VITALS
SYSTOLIC BLOOD PRESSURE: 148 MMHG | RESPIRATION RATE: 18 BRPM | BODY MASS INDEX: 23.44 KG/M2 | HEIGHT: 72 IN | HEART RATE: 86 BPM | WEIGHT: 173.06 LBS | DIASTOLIC BLOOD PRESSURE: 93 MMHG

## 2024-10-03 DIAGNOSIS — M54.50 NON-SPECIFIC LOW BACK PAIN: ICD-10-CM

## 2024-10-03 DIAGNOSIS — Z98.890 HISTORY OF LUMBAR LAMINECTOMY FOR SPINAL CORD DECOMPRESSION: Primary | ICD-10-CM

## 2024-10-03 RX ORDER — TRIAMCINOLONE ACETONIDE 1 MG/G
1 OINTMENT TOPICAL 2 TIMES DAILY
COMMUNITY
Start: 2024-09-11

## 2024-10-03 RX ORDER — HYDROCORTISONE 25 MG/G
CREAM TOPICAL
COMMUNITY
Start: 2024-09-08

## 2024-10-03 RX ORDER — FLUTICASONE PROPIONATE 50 MCG
1 SPRAY, SUSPENSION (ML) NASAL DAILY
COMMUNITY

## 2024-10-03 RX ORDER — NAPROXEN 500 MG/1
500 TABLET ORAL 2 TIMES DAILY
COMMUNITY

## 2024-10-03 RX ORDER — ZOLMITRIPTAN 5 MG/1
5 TABLET, FILM COATED ORAL
COMMUNITY

## 2024-10-03 RX ORDER — BACLOFEN 10 MG/1
TABLET ORAL
COMMUNITY

## 2024-10-03 RX ORDER — PROMETHAZINE HYDROCHLORIDE 25 MG/1
25 TABLET ORAL
COMMUNITY
Start: 2024-05-08

## 2024-10-03 RX ORDER — BUPROPION HYDROCHLORIDE 300 MG/1
300 TABLET ORAL DAILY
COMMUNITY

## 2024-10-03 RX ORDER — BUSPIRONE HYDROCHLORIDE 10 MG/1
10 TABLET ORAL 3 TIMES DAILY
COMMUNITY

## 2024-10-03 RX ORDER — HYDROCODONE BITARTRATE AND ACETAMINOPHEN 10; 325 MG/1; MG/1
1 TABLET ORAL 2 TIMES DAILY PRN
COMMUNITY

## 2024-10-03 RX ORDER — TESTOSTERONE CYPIONATE 200 MG/ML
INJECTION, SOLUTION INTRAMUSCULAR
COMMUNITY
Start: 2024-09-05

## 2024-10-03 NOTE — PROGRESS NOTES
[facility]  Clinic Consult     Consult Requested By: Pepe Rush  PCP: No, Primary Doctor    SUBJECTIVE:     Chief Complaint:   Chief Complaint   Patient presents with    Lumbar Spine Pain (L-Spine)     Patient present to clinic with c/o LBP.         History of Present Illness:  Jakob Taylor is a 53 y.o. male who presents for evaluation of back and leg pain. Patient has a history of laminectomy with Dr. Abdullahi approximately 18 months ago for treatment of right thigh numbness. He reports 60% improvement in symptoms. He reports that 1.5 months ago he moved a chair and threw out his back. He reports acute, severe pain. He had a severe spasm that caused him to lean to the side. He developed a bruise on the right low back following. The pain has improved some, but he is still symptomatic. He reports back pain with radiation into bilateral buttocks/hips with ambulation. He also has pain in the right leg when laying down. He was seen at urgent care following the initial presentation. He has not seen pain management since onset. He is also currently dealing with left knee injury, right elbow pain and migraines.     Pertinent and recent history, provider evaluations, imaging and data reviewed in EPIC        Past Medical History:   Diagnosis Date    Hernia, inguinal, left     History of chicken pox      Past Surgical History:   Procedure Laterality Date    HERNIA REPAIR      TONSILLECTOMY       Family History   Family history unknown: Yes     Social History     Tobacco Use    Smoking status: Never    Smokeless tobacco: Never   Substance Use Topics    Alcohol use: No     Alcohol/week: 0.0 standard drinks of alcohol    Drug use: No      Review of patient's allergies indicates:  No Known Allergies    Current Outpatient Medications:     atorvastatin (LIPITOR) 10 MG tablet, Take 5 mg by mouth every evening., Disp: , Rfl: 1    baclofen (LIORESAL) 10 MG tablet, Take by  mouth., Disp: , Rfl:     buPROPion (WELLBUTRIN XL) 300 MG 24 hr tablet, Take 300 mg by mouth once daily., Disp: , Rfl:     busPIRone (BUSPAR) 10 MG tablet, Take 10 mg by mouth 3 (three) times daily., Disp: , Rfl:     clonazePAM (KLONOPIN) 1 MG tablet, Take 1 mg by mouth every evening., Disp: , Rfl:     fluticasone propionate (FLONASE) 50 mcg/actuation nasal spray, 1 spray by Each Nostril route once daily., Disp: , Rfl:     HYDROcodone-acetaminophen (NORCO)  mg per tablet, Take 1 tablet by mouth 2 (two) times daily as needed., Disp: , Rfl:     hydrocortisone 2.5 % cream, SMARTSI Topical Daily, Disp: , Rfl:     naproxen (NAPROSYN) 500 MG tablet, Take 500 mg by mouth 2 (two) times daily., Disp: , Rfl:     omeprazole (PRILOSEC) 40 MG capsule, Take 40 mg by mouth once daily., Disp: , Rfl:     promethazine (PHENERGAN) 25 MG tablet, Take 25 mg by mouth., Disp: , Rfl:     testosterone cypionate (DEPOTESTOTERONE CYPIONATE) 200 mg/mL injection, SMARTSIG:Milliliter(s) IM, Disp: , Rfl:     triamcinolone acetonide 0.1% (KENALOG) 0.1 % ointment, Apply 1 application  topically 2 (two) times daily., Disp: , Rfl:     meloxicam (MOBIC) 15 MG tablet, Take 15 mg by mouth once daily. (Patient not taking: Reported on 10/3/2024), Disp: , Rfl:     mupirocin (BACTROBAN) 2 % ointment, Apply topically 3 (three) times daily. (Patient not taking: Reported on 10/3/2024), Disp: 22 g, Rfl: 0    ZOLMitriptan (ZOMIG) 5 MG tablet, Take 5 mg by mouth as needed for Migraine. (Patient not taking: Reported on 10/3/2024), Disp: , Rfl:     Review of Systems:   Constitutional: no fever, chills or night sweats. No changes in weight   Eyes: no visual changes   ENT: no nasal congestion or sore throat   Respiratory: no cough or shortness of breath   Cardiovascular: no chest pain or palpitations   Gastrointestinal: no nausea or vomiting   Genitourinary: no hematuria or dysuria   Integument/Breast: no rash or pruritis   Hematologic/Lymphatic: no easy  bruising or lymphadenopathy   Musculoskeletal: +back pain, leg pain   Neurological: no seizures or tremors   Behavioral/Psych: no auditory or visual hallucinations   Endocrine: no heat or cold intolerance         OBJECTIVE:     Vital Signs (Most Recent):  Pulse: 86 (10/03/24 0934)  Resp: 18 (10/03/24 0934)  BP: (!) 148/93 (10/03/24 0934)  Estimated body mass index is 23.47 kg/m² as calculated from the following:    Height as of this encounter: 6' (1.829 m).    Weight as of this encounter: 78.5 kg (173 lb 1 oz).    Physical Exam:   General: well developed, well nourished, no distress   Neurologic: Alert and oriented. Thought content appropriate. GCS 15.   Head: normocephalic, atraumatic  Eyes:  EOMI  Neck: trachea midline, no JVD   Cardiovascular: no LE edema  Pulmonary: normal respirations, no signs of respiratory distress  Abdomen: non-distended  Skin: Skin is warm, dry and intact    Motor Strength: Moves all extremities spontaneously with good tone. No abnormal movements seen.       Iliopsoas Quadriceps Knee  Flexion Tibialis  anterior Gastro- cnemius EHL   Lower: R 4/5 pain 5/5 5/5 5/5 5/5 5/5    L 4/5 pain 5/5 5/5 5/5 5/5 5/5     DTR's: 1+  Gait: antalgic           Diagnostic Results:  I have independently reviewed the following imaging:  Recent MRI reviewed  He has mild moderate disc degeneration from L2-L5  He had a previous right-sided went to disc herniation which is postop and no longer present      ASSESSMENT/PLAN:     History of lumbar laminectomy for spinal cord decompression    Non-specific low back pain        Jakob Taylor is a 53 y.o. male    I he has multiple orthopedic issues.  He has been struggling with his right elbow and medial epicondylitis, he has left hip pain bursitis questionable osteoarthritis.  Left knee pain which he is in a brace and uses a cane secondarily to this.  He has surgery with Dr. Abdullahi in 2023 for what appears to be right-sided L1-2 microdiskectomy with no longer  present  He has lumbar spondylosis from L2-L5, no significant stenosis or deformity  Multiple orthopedic complaints  Reviewed this in detail.  There is no surgical indication  [facility]  Will follow up for his orthopedic care and pain management                Patient verbalized understanding of plan. Encouraged to call with any questions or concerns.     This note was partially dictated using voice recognition software, so please excuse any errors that were not corrected.